# Patient Record
Sex: MALE | Race: WHITE | NOT HISPANIC OR LATINO | URBAN - METROPOLITAN AREA
[De-identification: names, ages, dates, MRNs, and addresses within clinical notes are randomized per-mention and may not be internally consistent; named-entity substitution may affect disease eponyms.]

---

## 2021-10-06 ENCOUNTER — EMERGENCY (EMERGENCY)
Facility: HOSPITAL | Age: 51
LOS: 1 days | Discharge: HOME | End: 2021-10-06
Attending: EMERGENCY MEDICINE | Admitting: EMERGENCY MEDICINE
Payer: COMMERCIAL

## 2021-10-06 VITALS
OXYGEN SATURATION: 97 % | RESPIRATION RATE: 18 BRPM | SYSTOLIC BLOOD PRESSURE: 132 MMHG | HEART RATE: 68 BPM | DIASTOLIC BLOOD PRESSURE: 75 MMHG

## 2021-10-06 VITALS
SYSTOLIC BLOOD PRESSURE: 158 MMHG | OXYGEN SATURATION: 97 % | HEART RATE: 91 BPM | DIASTOLIC BLOOD PRESSURE: 77 MMHG | TEMPERATURE: 99 F | RESPIRATION RATE: 18 BRPM | WEIGHT: 147.93 LBS

## 2021-10-06 DIAGNOSIS — R07.89 OTHER CHEST PAIN: ICD-10-CM

## 2021-10-06 LAB
ALBUMIN SERPL ELPH-MCNC: 4.3 G/DL — SIGNIFICANT CHANGE UP (ref 3.3–5)
ALP SERPL-CCNC: 50 U/L — SIGNIFICANT CHANGE UP (ref 40–120)
ALT FLD-CCNC: 12 U/L — SIGNIFICANT CHANGE UP (ref 10–45)
ANION GAP SERPL CALC-SCNC: 9 MMOL/L — SIGNIFICANT CHANGE UP (ref 5–17)
AST SERPL-CCNC: 17 U/L — SIGNIFICANT CHANGE UP (ref 10–40)
BASOPHILS # BLD AUTO: 0.02 K/UL — SIGNIFICANT CHANGE UP (ref 0–0.2)
BASOPHILS NFR BLD AUTO: 0.3 % — SIGNIFICANT CHANGE UP (ref 0–2)
BILIRUB SERPL-MCNC: 0.2 MG/DL — SIGNIFICANT CHANGE UP (ref 0.2–1.2)
BUN SERPL-MCNC: 16 MG/DL — SIGNIFICANT CHANGE UP (ref 7–23)
CALCIUM SERPL-MCNC: 10 MG/DL — SIGNIFICANT CHANGE UP (ref 8.4–10.5)
CHLORIDE SERPL-SCNC: 102 MMOL/L — SIGNIFICANT CHANGE UP (ref 96–108)
CK MB CFR SERPL CALC: 2.1 NG/ML — SIGNIFICANT CHANGE UP (ref 0–6.7)
CK SERPL-CCNC: 103 U/L — SIGNIFICANT CHANGE UP (ref 30–200)
CO2 SERPL-SCNC: 28 MMOL/L — SIGNIFICANT CHANGE UP (ref 22–31)
CREAT SERPL-MCNC: 0.87 MG/DL — SIGNIFICANT CHANGE UP (ref 0.5–1.3)
EOSINOPHIL # BLD AUTO: 0.03 K/UL — SIGNIFICANT CHANGE UP (ref 0–0.5)
EOSINOPHIL NFR BLD AUTO: 0.5 % — SIGNIFICANT CHANGE UP (ref 0–6)
GLUCOSE SERPL-MCNC: 154 MG/DL — HIGH (ref 70–99)
HCT VFR BLD CALC: 44.2 % — SIGNIFICANT CHANGE UP (ref 39–50)
HGB BLD-MCNC: 14.1 G/DL — SIGNIFICANT CHANGE UP (ref 13–17)
IMM GRANULOCYTES NFR BLD AUTO: 0.5 % — SIGNIFICANT CHANGE UP (ref 0–1.5)
LYMPHOCYTES # BLD AUTO: 1.62 K/UL — SIGNIFICANT CHANGE UP (ref 1–3.3)
LYMPHOCYTES # BLD AUTO: 27.1 % — SIGNIFICANT CHANGE UP (ref 13–44)
MCHC RBC-ENTMCNC: 30.4 PG — SIGNIFICANT CHANGE UP (ref 27–34)
MCHC RBC-ENTMCNC: 31.9 GM/DL — LOW (ref 32–36)
MCV RBC AUTO: 95.3 FL — SIGNIFICANT CHANGE UP (ref 80–100)
MONOCYTES # BLD AUTO: 0.65 K/UL — SIGNIFICANT CHANGE UP (ref 0–0.9)
MONOCYTES NFR BLD AUTO: 10.9 % — SIGNIFICANT CHANGE UP (ref 2–14)
NEUTROPHILS # BLD AUTO: 3.63 K/UL — SIGNIFICANT CHANGE UP (ref 1.8–7.4)
NEUTROPHILS NFR BLD AUTO: 60.7 % — SIGNIFICANT CHANGE UP (ref 43–77)
NRBC # BLD: 0 /100 WBCS — SIGNIFICANT CHANGE UP (ref 0–0)
PLATELET # BLD AUTO: 283 K/UL — SIGNIFICANT CHANGE UP (ref 150–400)
POTASSIUM SERPL-MCNC: 4.5 MMOL/L — SIGNIFICANT CHANGE UP (ref 3.5–5.3)
POTASSIUM SERPL-SCNC: 4.5 MMOL/L — SIGNIFICANT CHANGE UP (ref 3.5–5.3)
PROT SERPL-MCNC: 7.6 G/DL — SIGNIFICANT CHANGE UP (ref 6–8.3)
RBC # BLD: 4.64 M/UL — SIGNIFICANT CHANGE UP (ref 4.2–5.8)
RBC # FLD: 13.3 % — SIGNIFICANT CHANGE UP (ref 10.3–14.5)
SODIUM SERPL-SCNC: 139 MMOL/L — SIGNIFICANT CHANGE UP (ref 135–145)
TROPONIN T SERPL-MCNC: 0.01 NG/ML — SIGNIFICANT CHANGE UP (ref 0–0.01)
WBC # BLD: 5.98 K/UL — SIGNIFICANT CHANGE UP (ref 3.8–10.5)
WBC # FLD AUTO: 5.98 K/UL — SIGNIFICANT CHANGE UP (ref 3.8–10.5)

## 2021-10-06 PROCEDURE — 93010 ELECTROCARDIOGRAM REPORT: CPT

## 2021-10-06 PROCEDURE — 82550 ASSAY OF CK (CPK): CPT

## 2021-10-06 PROCEDURE — 82553 CREATINE MB FRACTION: CPT

## 2021-10-06 PROCEDURE — 36415 COLL VENOUS BLD VENIPUNCTURE: CPT

## 2021-10-06 PROCEDURE — 93005 ELECTROCARDIOGRAM TRACING: CPT

## 2021-10-06 PROCEDURE — 71046 X-RAY EXAM CHEST 2 VIEWS: CPT

## 2021-10-06 PROCEDURE — 71046 X-RAY EXAM CHEST 2 VIEWS: CPT | Mod: 26

## 2021-10-06 PROCEDURE — G1004: CPT

## 2021-10-06 PROCEDURE — 99285 EMERGENCY DEPT VISIT HI MDM: CPT | Mod: 25

## 2021-10-06 PROCEDURE — 85025 COMPLETE CBC W/AUTO DIFF WBC: CPT

## 2021-10-06 PROCEDURE — 75574 CT ANGIO HRT W/3D IMAGE: CPT | Mod: 26,MG

## 2021-10-06 PROCEDURE — 99284 EMERGENCY DEPT VISIT MOD MDM: CPT | Mod: 25

## 2021-10-06 PROCEDURE — 84484 ASSAY OF TROPONIN QUANT: CPT

## 2021-10-06 PROCEDURE — 80053 COMPREHEN METABOLIC PANEL: CPT

## 2021-10-06 PROCEDURE — 75574 CT ANGIO HRT W/3D IMAGE: CPT | Mod: MG

## 2021-10-06 RX ORDER — METOPROLOL TARTRATE 50 MG
100 TABLET ORAL ONCE
Refills: 0 | Status: COMPLETED | OUTPATIENT
Start: 2021-10-06 | End: 2021-10-06

## 2021-10-06 RX ADMIN — Medication 100 MILLIGRAM(S): at 15:03

## 2021-10-06 NOTE — ED PROVIDER NOTE - ATTENDING CONTRIBUTION TO CARE
Pt is afebrile and hemodynamically stable. He has no acute ischemic changes or arrythmia on ECG. Cardiac enzymes wnl. CBC, CMP unremarkable. Coronary CTA obtained and notable for:  Cardiac:  1.  The calcium score is 0 Agatston units.  2.  Normal Left Main.  3.  Mild to moderate stenosis of the mid left anterior descending artery.  4.  Normal left circumflex and right coronary artery.    Non-cardiac:  1. 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    Discussed all results with pt. Will refer to cardiologist for follow up. Return precautions given.    Agree with above note as documented by PA.  I was available as the supervising attending during patient's ER evaluation.    Pt well appearing and aware of results - understands return precautions.  Otherwise, will follow up with PMD/cardiologist.

## 2021-10-06 NOTE — ED PROVIDER NOTE - NSFOLLOWUPINSTRUCTIONS_ED_ALL_ED_FT
Your electrocardiogram, labs, and CT angiogram were reassuring today.    Please follow up with your primary care doctor and a cardiologist (see information below). If you have any difficulty obtaining an appointment, please call our Referrals Coordinator at [phone].     Return to the Emergency Department if you develop fever>100.4F, worsening chest pain, shortness of breath, severe abdominal pain, vomiting or any other concerns.

## 2021-10-06 NOTE — ED PROVIDER NOTE - CARE PROVIDERS DIRECT ADDRESSES
[email or web address]

## 2021-10-06 NOTE — ED PROVIDER NOTE - OBJECTIVE STATEMENT
50yo male with no reported pmhx presents with chest pain. Pt reports onset of left-sided, non-radiating, non-exertional chest pain at 1pm yesterday which lasted approx. 1hr. He states pain resolved and he was able to exercise that evening without chest pain. He reports pain returned this morning around 8am and has been persistent. He denies associated fever, chills, cough, shortness of breath, leg swelling, recent travel or surgery, personal cardiac history, history of DVT/PE, family history of MI <66yo, smoking history.

## 2021-10-06 NOTE — ED PROVIDER NOTE - CARE PROVIDER_API CALL
Scott Camejo  CARDIOLOGY  [address] [address]  Phone: [phone]  Fax: [phone]  Follow Up Time:     Anay London)  Cardiovascular Disease; Internal Medicine  [address] [place] [address], [address], [address] [place] [address]  Phone: [phone]  Fax: [phone]  Follow Up Time:

## 2021-10-06 NOTE — ED PROVIDER NOTE - CLINICAL SUMMARY MEDICAL DECISION MAKING FREE TEXT BOX
Pt is afebrile and hemodynamically stable. He has no acute ischemic changes or arrythmia on ECG. Cardiac enzymes wnl. CBC, CMP unremarkable. Coronary CTA obtained and notable for:  Cardiac:  1.  The calcium score is 0 Agatston units.  2.  Normal Left Main.  3.  Mild to moderate stenosis of the mid left anterior descending artery.  4.  Normal left circumflex and right coronary artery.    Non-cardiac:  1. 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    Discussed all results with pt. Will refer to cardiologist for follow up. Return precautions given.

## 2021-10-06 NOTE — ED ADULT NURSE NOTE - OBJECTIVE STATEMENT
Patient to ED for L sided chest pain since yesterday. States was on a zoom call when had sudden L sided chest tightness. Denies any radiation of pain, resolved with no intervention after a few hours. Pain returned today, prompting ED visit. Denies shortness of breath, dizziness, diaphoresis, nausea/vomiting. Denies fevers/chills or cough. Appears comfortable during assessment, heart sounds auscultated, S1 and S2 heard, lungs clear bilaterally.

## 2021-10-06 NOTE — ED ADULT TRIAGE NOTE - CHIEF COMPLAINT QUOTE
Pt presents to ED C/O intermittent L sided chest pain starting last night. Pt states " My symptoms started last night, then I felt better, but I felt slight pain again this morning" Pt denies SOB, N/V/D, SOB, cough. Denies PMH.

## 2021-10-06 NOTE — ED PROVIDER NOTE - PATIENT PORTAL LINK FT
You can access the FollowMyHealth Patient Portal offered by [facility] by registering at the following website: [email or web address]. By joining [employer]’s FollowMyHealth portal, you will also be able to view your health information using other applications (apps) compatible with our system.

## 2021-11-01 PROBLEM — Z78.9 OTHER SPECIFIED HEALTH STATUS: Chronic | Status: ACTIVE | Noted: 2021-10-06

## 2021-11-06 ENCOUNTER — EMERGENCY (EMERGENCY)
Facility: HOSPITAL | Age: 51
LOS: 1 days | Discharge: HOME | End: 2021-11-06
Attending: EMERGENCY MEDICINE | Admitting: EMERGENCY MEDICINE
Payer: COMMERCIAL

## 2021-11-06 VITALS
SYSTOLIC BLOOD PRESSURE: 126 MMHG | HEART RATE: 77 BPM | OXYGEN SATURATION: 98 % | RESPIRATION RATE: 18 BRPM | HEIGHT: 71 IN | DIASTOLIC BLOOD PRESSURE: 83 MMHG | TEMPERATURE: 98 F | WEIGHT: 147.93 LBS
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
## 2021-11-06 DIAGNOSIS — S61.011A LACERATION WITHOUT FOREIGN BODY OF RIGHT THUMB WITHOUT DAMAGE TO NAIL, INITIAL ENCOUNTER: ICD-10-CM
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
## 2021-11-06 PROCEDURE — 12001 RPR S/N/AX/GEN/TRNK 2.5CM/<: CPT

## 2021-11-06 PROCEDURE — 99283 EMERGENCY DEPT VISIT LOW MDM: CPT | Mod: 25

## 2021-11-06 PROCEDURE — 99282 EMERGENCY DEPT VISIT SF MDM: CPT | Mod: 25

## 2021-11-06 RX ORDER — LIDOCAINE HCL 20 MG/ML
20 VIAL (ML) INJECTION ONCE
Refills: 0 | Status: COMPLETED | OUTPATIENT
Start: 2021-11-06 | End: 2021-11-06

## 2021-11-06 RX ADMIN — Medication 20 MILLILITER(S): at 19:13

## 2021-11-06 NOTE — ED PROVIDER NOTE - NSFOLLOWUPINSTRUCTIONS_ED_ALL_ED_FT
Tissue Adhesive Wound Care      Some cuts, wounds, lacerations, and incisions can be repaired by using tissue adhesive, also called skin glue. It holds the skin together so healing can happen faster. It forms a strong bond on the skin in about 1 minute, and it reaches its full strength in about 2–3 minutes. The adhesive disappears naturally while the wound is healing. It is important to take good care of your wound at home while it heals.      Follow these instructions at home:      Wound care                   •If a bandage (dressing) has been applied, keep it clean and dry.    •Follow instructions from your health care provider about how often to change the dressing.  •Wash your hands with soap and water for at least 20 seconds before and after you change your dressing. If soap and water are not available, use hand .      •Change your dressing as told by your health care provider.      •Leave tissue adhesive in place. It will come off naturally after 7–10 days.        • Do not scratch, rub, or pick at the adhesive.      • Do not place tape over the adhesive. The adhesive could come off the wound when you pull the tape off.      •Protect the wound from further injury until it is healed.    •Check your wound area every day for signs of infection. Check for:  •More redness, swelling, or pain.      •Fluid or blood.      •Warmth.      •Pus or a bad smell.        Bathing   • Do not take baths, swim, or use a hot tub until your health care provider approves. You may only be allowed to take sponge baths. Ask your health care provider if you may take showers.  •You can usually shower after the first 24 hours.      •Cover the dressing with a watertight covering when you take a shower.        • Do not soak the area where there is tissue adhesive.      • Do not use any soaps, petroleum jelly products, or ointments on the wound. Certain ointments can weaken the adhesive.      Eating and drinking   •Eat a diet that includes protein, vitamin A, vitamin C, and other nutrients to help the wound heal. As told by your health care provider, eat a diet that contains food rich in:  •Protein. These include meat, fish, eggs, dairy, beans, and nuts.      •Vitamin A. These include carrots and dark green, leafy vegetables.      •Vitamin C. These include citrus fruits, tomatoes, broccoli, and peppers.        •Drink enough fluid to keep your urine pale yellow.      General instructions     •Protect your wound from the sun when you are outside for the first 6 months, or for as long as told by your health care provider. Apply sunscreen with an SPF of 30 or higher around the scar, or cover it up.      •Take over-the-counter and prescription medicines only as told by your health care provider.      • Do not use any products that contain nicotine or tobacco, such as cigarettes, e-cigarettes, and chewing tobacco. These can delay wound healing. If you need help quitting, ask your health care provider.      •Keep all follow-up visits as told by your health care provider. This is important.        Contact a health care provider if:    •The tissue adhesive becomes soaked with blood or falls off before your wound has healed. The adhesive may need to be replaced.      •You have a fever or chills.        Get help right away if:    •Your wound reopens.    •You have any of these signs of infection:  •More redness, swelling, or pain around the wound.      •A red streak at the area around the wound.      •Fluid or blood coming from the wound.      •Warmth coming from the wound.      •Pus or a bad smell coming from the wound.        •You develop a rash after the adhesive is applied.        Summary    •The adhesive disappears naturally while the wound is healing. It is important to take good care of your wound at home while it heals.      •Always wash your hands for at least 20 seconds with soap and water before and after changing your bandage (dressing).      •To help with healing, eat foods that are rich in protein, vitamin A, vitamin C, and other nutrients.      •Check your wound area every day for signs of infection. Get help right away if you suspect that your wound is infected.      This information is not intended to replace advice given to you by your health care provider. Make sure you discuss any questions you have with your health care provider.      Can take tylenol or motrin every 6hrs as needed for pain.  Stay well hydrated.  Return for fevers, spreading redness, persistent vomit, uncontrolled pain, worsening breathing, worsening lightheaded, focal weakness/numbness, vision changes.  Follow up with primary doctor within 1-2 days.   Return to primary doctor or ER in 7-10 days for suture removal.  You had 2 sutures placed.  Keep dry for first 48hours then clean lightly with soap and water.    Laceration    A laceration is a cut that goes through all of the layers of the skin and into the tissue that is right under the skin. Some lacerations heal on their own. Others need to be closed with skin adhesive strips, skin glue, stitches (sutures), or staples. Proper laceration care minimizes the risk of infection and helps the laceration to heal better.  If non-absorbable stitches or staples have been placed, they must be taken out within the time frame instructed by your healthcare provider.    SEEK IMMEDIATE MEDICAL CARE IF YOU HAVE ANY OF THE FOLLOWING SYMPTOMS: swelling around the wound, worsening pain, drainage from the wound, red streaking going away from your wound, inability to move finger or toe near the laceration, or discoloration of skin near the laceration.

## 2021-11-06 NOTE — ED PROVIDER NOTE - PATIENT PORTAL LINK FT
You can access the FollowMyHealth Patient Portal offered by [facility] by registering at the following website: [email or web address]. By joining [employer]’s FollowMyHealth portal, you will also be able to view your health information using other applications (apps) compatible with our system.

## 2021-11-06 NOTE — ED ADULT NURSE NOTE - OBJECTIVE STATEMENT
pt received into spot L A&Ox3 amb appears comfortable arrives via [facility] intriage for eval of right thumb lac on kitchen knife cleaned it at home cleading controlled with band aid on arrival pt observed sitting hallway reading book prior to eval by Md Tejada unknown last tetanus

## 2021-11-06 NOTE — ED ADULT TRIAGE NOTE - CHIEF COMPLAINT QUOTE
Pt presents to ED c/o laceration to right thumb. Accidentally cut self with new kitchen knife. Last tetanus unknown.

## 2021-11-06 NOTE — ED PROVIDER NOTE - PHYSICAL EXAMINATION
CONSTITUTIONAL: Well appearing, awake, alert and in no apparent distress.  HEENT: Head is atraumatic. Eyes clear bilaterally, normal EOMI. Airway patent.  MUSCULOSKELETAL: Able to bend and extend at DIP joint, superficial 2cm U shaped laceration over knuckle over DIP. Range of motion is not limited, no muscle or joint tenderness. no bony tenderness. no JVD, peripheral edema.   NEUROLOGICAL: Alert and oriented, no focal deficits, no motor or sensory deficits.  SKIN: Skin normal color for race, warm, dry. No evidence of rash.  PSYCHIATRIC: Alert and oriented to person, place, time/situation. normal mood and affect. no apparent risk to self or others.

## 2021-11-06 NOTE — ED PROVIDER NOTE - CLINICAL SUMMARY MEDICAL DECISION MAKING FREE TEXT BOX
52 y/o M with thumb laceration. Will suture and reassess. 52 y/o M with thumb laceration. Despite adequate lidocaine for digital block, pt still with significant pain during procedure. Given superficial, placed 2 sutures w dermabond.

## 2021-11-06 NOTE — ED PROVIDER NOTE - OBJECTIVE STATEMENT
52 y/o M, healthy, comes in with laceration to his R thumb. Patient states that he was using a clean kitchen knife, had just soaped it when it cut his R thumb superficially. No numbness, paresthesias. Able to bend and extend, no other complaints.

## 2021-11-06 NOTE — ED PROCEDURE NOTE - CPROC ED ANATOMIC LOCATION1
R thumb [Fever] : no fever [Sore Throat] : no sore throat [Chest Pain] : no chest pain [Cough] : no cough [Constipation] : no constipation [Dysuria] : no dysuria

## 2021-11-10 ENCOUNTER — EMERGENCY (EMERGENCY)
Facility: HOSPITAL | Age: 51
LOS: 1 days | Discharge: HOME | End: 2021-11-10
Attending: EMERGENCY MEDICINE | Admitting: EMERGENCY MEDICINE
Payer: COMMERCIAL

## 2021-11-10 VITALS
RESPIRATION RATE: 18 BRPM | DIASTOLIC BLOOD PRESSURE: 82 MMHG | SYSTOLIC BLOOD PRESSURE: 140 MMHG | HEART RATE: 88 BPM | TEMPERATURE: 98 F | OXYGEN SATURATION: 100 %

## 2021-11-10 VITALS
OXYGEN SATURATION: 97 % | SYSTOLIC BLOOD PRESSURE: 134 MMHG | DIASTOLIC BLOOD PRESSURE: 85 MMHG | HEIGHT: 71 IN | HEART RATE: 71 BPM | RESPIRATION RATE: 18 BRPM | TEMPERATURE: 98 F | WEIGHT: 147.93 LBS
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
## 2021-11-10 DIAGNOSIS — R21 RASH AND OTHER NONSPECIFIC SKIN ERUPTION: ICD-10-CM

## 2021-11-10 DIAGNOSIS — L03.011 CELLULITIS OF RIGHT FINGER: ICD-10-CM

## 2021-11-10 DIAGNOSIS — S61.011D LACERATION WITHOUT FOREIGN BODY OF RIGHT THUMB WITHOUT DAMAGE TO NAIL, SUBSEQUENT ENCOUNTER: ICD-10-CM

## 2021-11-10 DIAGNOSIS — R20.0 ANESTHESIA OF SKIN: ICD-10-CM
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
## 2021-11-10 DIAGNOSIS — L03.113 CELLULITIS OF RIGHT UPPER LIMB: ICD-10-CM

## 2021-11-10 PROBLEM — Z00.00 ENCOUNTER FOR PREVENTIVE HEALTH EXAMINATION: Status: ACTIVE | Noted: 2021-11-10

## 2021-11-10 LAB
ALBUMIN SERPL ELPH-MCNC: 4.5 G/DL — SIGNIFICANT CHANGE UP (ref 3.3–5)
ALP SERPL-CCNC: 61 U/L — SIGNIFICANT CHANGE UP (ref 40–120)
ALT FLD-CCNC: 19 U/L — SIGNIFICANT CHANGE UP (ref 10–45)
ANION GAP SERPL CALC-SCNC: 10 MMOL/L — SIGNIFICANT CHANGE UP (ref 5–17)
AST SERPL-CCNC: 21 U/L — SIGNIFICANT CHANGE UP (ref 10–40)
BASOPHILS # BLD AUTO: 0.02 K/UL — SIGNIFICANT CHANGE UP (ref 0–0.2)
BASOPHILS NFR BLD AUTO: 0.2 % — SIGNIFICANT CHANGE UP (ref 0–2)
BILIRUB SERPL-MCNC: 0.4 MG/DL — SIGNIFICANT CHANGE UP (ref 0.2–1.2)
BUN SERPL-MCNC: 12 MG/DL — SIGNIFICANT CHANGE UP (ref 7–23)
CALCIUM SERPL-MCNC: 9.7 MG/DL — SIGNIFICANT CHANGE UP (ref 8.4–10.5)
CHLORIDE SERPL-SCNC: 102 MMOL/L — SIGNIFICANT CHANGE UP (ref 96–108)
CO2 SERPL-SCNC: 30 MMOL/L — SIGNIFICANT CHANGE UP (ref 22–31)
CREAT SERPL-MCNC: 0.84 MG/DL — SIGNIFICANT CHANGE UP (ref 0.5–1.3)
EOSINOPHIL # BLD AUTO: 0.05 K/UL — SIGNIFICANT CHANGE UP (ref 0–0.5)
EOSINOPHIL NFR BLD AUTO: 0.6 % — SIGNIFICANT CHANGE UP (ref 0–6)
GLUCOSE SERPL-MCNC: 102 MG/DL — HIGH (ref 70–99)
HCT VFR BLD CALC: 43.6 % — SIGNIFICANT CHANGE UP (ref 39–50)
HGB BLD-MCNC: 13.9 G/DL — SIGNIFICANT CHANGE UP (ref 13–17)
IMM GRANULOCYTES NFR BLD AUTO: 0.2 % — SIGNIFICANT CHANGE UP (ref 0–1.5)
LACTATE SERPL-SCNC: 0.8 MMOL/L — SIGNIFICANT CHANGE UP (ref 0.5–2)
LYMPHOCYTES # BLD AUTO: 1.85 K/UL — SIGNIFICANT CHANGE UP (ref 1–3.3)
LYMPHOCYTES # BLD AUTO: 21.7 % — SIGNIFICANT CHANGE UP (ref 13–44)
MCHC RBC-ENTMCNC: 29.8 PG — SIGNIFICANT CHANGE UP (ref 27–34)
MCHC RBC-ENTMCNC: 31.9 GM/DL — LOW (ref 32–36)
MCV RBC AUTO: 93.6 FL — SIGNIFICANT CHANGE UP (ref 80–100)
MONOCYTES # BLD AUTO: 0.71 K/UL — SIGNIFICANT CHANGE UP (ref 0–0.9)
MONOCYTES NFR BLD AUTO: 8.3 % — SIGNIFICANT CHANGE UP (ref 2–14)
NEUTROPHILS # BLD AUTO: 5.86 K/UL — SIGNIFICANT CHANGE UP (ref 1.8–7.4)
NEUTROPHILS NFR BLD AUTO: 69 % — SIGNIFICANT CHANGE UP (ref 43–77)
NRBC # BLD: 0 /100 WBCS — SIGNIFICANT CHANGE UP (ref 0–0)
PLATELET # BLD AUTO: 317 K/UL — SIGNIFICANT CHANGE UP (ref 150–400)
POTASSIUM SERPL-MCNC: 4.1 MMOL/L — SIGNIFICANT CHANGE UP (ref 3.5–5.3)
POTASSIUM SERPL-SCNC: 4.1 MMOL/L — SIGNIFICANT CHANGE UP (ref 3.5–5.3)
PROT SERPL-MCNC: 8 G/DL — SIGNIFICANT CHANGE UP (ref 6–8.3)
RBC # BLD: 4.66 M/UL — SIGNIFICANT CHANGE UP (ref 4.2–5.8)
RBC # FLD: 12.8 % — SIGNIFICANT CHANGE UP (ref 10.3–14.5)
SODIUM SERPL-SCNC: 142 MMOL/L — SIGNIFICANT CHANGE UP (ref 135–145)
WBC # BLD: 8.51 K/UL — SIGNIFICANT CHANGE UP (ref 3.8–10.5)
WBC # FLD AUTO: 8.51 K/UL — SIGNIFICANT CHANGE UP (ref 3.8–10.5)

## 2021-11-10 PROCEDURE — 73140 X-RAY EXAM OF FINGER(S): CPT

## 2021-11-10 PROCEDURE — 85025 COMPLETE CBC W/AUTO DIFF WBC: CPT

## 2021-11-10 PROCEDURE — 99284 EMERGENCY DEPT VISIT MOD MDM: CPT | Mod: 25

## 2021-11-10 PROCEDURE — 96374 THER/PROPH/DIAG INJ IV PUSH: CPT

## 2021-11-10 PROCEDURE — 83605 ASSAY OF LACTIC ACID: CPT

## 2021-11-10 PROCEDURE — 73140 X-RAY EXAM OF FINGER(S): CPT | Mod: 26,RT

## 2021-11-10 PROCEDURE — 80053 COMPREHEN METABOLIC PANEL: CPT

## 2021-11-10 PROCEDURE — 36415 COLL VENOUS BLD VENIPUNCTURE: CPT

## 2021-11-10 PROCEDURE — 96375 TX/PRO/DX INJ NEW DRUG ADDON: CPT

## 2021-11-10 PROCEDURE — 99284 EMERGENCY DEPT VISIT MOD MDM: CPT

## 2021-11-10 PROCEDURE — 87040 BLOOD CULTURE FOR BACTERIA: CPT

## 2021-11-10 RX ORDER — DIPHENHYDRAMINE HCL 50 MG
50 CAPSULE ORAL ONCE
Refills: 0 | Status: COMPLETED | OUTPATIENT
Start: 2021-11-10 | End: 2021-11-10

## 2021-11-10 RX ORDER — AMPICILLIN SODIUM AND SULBACTAM SODIUM 250; 125 MG/ML; MG/ML
3 INJECTION, POWDER, FOR SUSPENSION INTRAMUSCULAR; INTRAVENOUS ONCE
Refills: 0 | Status: COMPLETED | OUTPATIENT
Start: 2021-11-10 | End: 2021-11-10

## 2021-11-10 RX ORDER — PIPERACILLIN AND TAZOBACTAM 4; .5 G/20ML; G/20ML
3.38 INJECTION, POWDER, LYOPHILIZED, FOR SOLUTION INTRAVENOUS ONCE
Refills: 0 | Status: DISCONTINUED | OUTPATIENT
Start: 2021-11-10 | End: 2021-11-10

## 2021-11-10 RX ORDER — KETOROLAC TROMETHAMINE 30 MG/ML
15 SYRINGE (ML) INJECTION ONCE
Refills: 0 | Status: DISCONTINUED | OUTPATIENT
Start: 2021-11-10 | End: 2021-11-10

## 2021-11-10 RX ORDER — TETANUS TOXOID, REDUCED DIPHTHERIA TOXOID AND ACELLULAR PERTUSSIS VACCINE, ADSORBED 5; 2.5; 8; 8; 2.5 [IU]/.5ML; [IU]/.5ML; UG/.5ML; UG/.5ML; UG/.5ML
0.5 SUSPENSION INTRAMUSCULAR ONCE
Refills: 0 | Status: DISCONTINUED | OUTPATIENT
Start: 2021-11-10 | End: 2021-11-13

## 2021-11-10 RX ORDER — VANCOMYCIN HCL 1 G
1000 VIAL (EA) INTRAVENOUS ONCE
Refills: 0 | Status: COMPLETED | OUTPATIENT
Start: 2021-11-10 | End: 2021-11-10

## 2021-11-10 RX ADMIN — AMPICILLIN SODIUM AND SULBACTAM SODIUM 200 GRAM(S): 250; 125 INJECTION, POWDER, FOR SUSPENSION INTRAMUSCULAR; INTRAVENOUS at 14:04

## 2021-11-10 RX ADMIN — Medication 250 MILLIGRAM(S): at 13:00

## 2021-11-10 NOTE — ED PROVIDER NOTE - OBJECTIVE STATEMENT
52 y/o M denies any PMHx s/p laceration repair at [facility] on Saturday. Pt was cleaning a new knife when he lacerated his right distal DIP joint of thumb. Came to [facility], had 2 sutures placed, and was given Dermabond. Was not prescribed any prophylaxis antibiotics at that time. Pt states he has been keeping the site clean and wrapped as directed. Now presents to ED today with warm rash and numbness at laceration and has been tracking up the arm to armpit for the past 3 hours. Denies fever, chills, N/V, or pain or swelling to upper extremities.

## 2021-11-10 NOTE — ED PROVIDER NOTE - NSFOLLOWUPINSTRUCTIONS_ED_ALL_ED_FT
Continue medications as prescribed and follow up with  next week for re-evaluation next week         CELLULITIS - AfterCare(R) Instructions(ER/ED)           Cellulitis    WHAT YOU NEED TO KNOW:    Cellulitis is a skin infection caused by bacteria. Cellulitis is common and can become severe. Cellulitis usually appears on the lower legs. It can also appear on the arms, face, and other areas. Cellulitis develops when bacteria enter a crack or break in your skin, such as a scratch, bite, or cut.    Cellulitis          DISCHARGE INSTRUCTIONS:    Return to the emergency department if:   •Your wound gets larger and more painful.      •You feel a crackling under your skin when you touch it.      •You have purple dots or bumps on your skin, or you see bleeding under your skin.      •You see red streaks coming from the infected area.      Call your doctor if:   •The red, warm, swollen area gets larger.      •Your fever or pain does not go away or gets worse.      •The area does not get smaller after 3 days of antibiotics.      •You have questions or concerns about your condition or care.      Medicines: You should start to see improvement in 3 days. If cellulitis is not treated, the infection can spread through your body and become life-threatening. You may need any of the following medicines:  •Antibiotics help treat the bacterial infection.      •Acetaminophen decreases pain and fever. It is available without a doctor's order. Ask how much to take and how often to take it. Follow directions. Read the labels of all other medicines you are using to see if they also contain acetaminophen, or ask your doctor or pharmacist. Acetaminophen can cause liver damage if not taken correctly. Do not use more than 4 grams (4,000 milligrams) total of acetaminophen in one day.       •NSAIDs, such as ibuprofen, help decrease swelling, pain, and fever. This medicine is available with or without a doctor's order. NSAIDs can cause stomach bleeding or kidney problems in certain people. If you take blood thinner medicine, always ask your healthcare provider if NSAIDs are safe for you. Always read the medicine label and follow directions.      •Take your medicine as directed. Contact your healthcare provider if you think your medicine is not helping or if you have side effects. Tell him or her if you are allergic to any medicine. Keep a list of the medicines, vitamins, and herbs you take. Include the amounts, and when and why you take them. Bring the list or the pill bottles to follow-up visits. Carry your medicine list with you in case of an emergency.      Self-care:   •Wash the area with soap and water every day. Gently pat dry. Use bandages if directed by your healthcare provider.      •Elevate the area above the level of your heart as often as you can. This will help decrease swelling and pain. Prop the area on pillows or blankets to keep it elevated comfortably.  Elevate Leg           •Place a cool, damp cloth on the area. Use clean cloths and clean water. You can do this as often as you need to. Cool, damp cloths may help decrease pain.      •Apply cream or ointment as directed. These help protect the area. Most over-the-counter products, such as petroleum jelly, are good to use. Ask your healthcare provider about specific creams or ointments you should use.      Prevent cellulitis:   •Do not scratch bug bites or areas of injury. You increase your risk for cellulitis by scratching these areas.      •Do not share personal items, such as towels, clothing, and razors.      •Clean exercise equipment with germ-killing  before and after you use it.      •Treat athlete’s foot. This can help prevent the spread of a bacterial skin infection.      •Wash your hands often. Use soap and water. Wash your hands after you use the bathroom, change a child's diapers, or sneeze. Wash your hands before you prepare or eat food. Use lotion to prevent dry, cracked skin.  Handwashing           Follow up with your doctor within 3 days, or as directed: He or she will check if your cellulitis is getting better. Write down your questions so you remember to ask them during your visits.

## 2021-11-10 NOTE — ED PROVIDER NOTE - PATIENT PORTAL LINK FT
You can access the FollowMyHealth Patient Portal offered by [facility] by registering at the following website: [email or web address]. By joining [employer]’s FollowMyHealth portal, you will also be able to view your health information using other applications (apps) compatible with our system.

## 2021-11-10 NOTE — ED PROVIDER NOTE - CLINICAL SUMMARY MEDICAL DECISION MAKING FREE TEXT BOX
50 y/o M denies PMHx s/p laceration repair on Saturday to the right distal DIP joint of thumb presents to ED with rash, pain, and swelling to laceration. Rash has been tracking up arm from laceration to armpit for the past 3 hours and is rapidly progressing. Plan for labs (including blood cultures) and give antibiotics.

## 2021-11-10 NOTE — ED PROVIDER NOTE - SKIN, MLM
Right thumb DIP joint is noted to have 2 sutures and Dermabond placed on laceration. Joint is mildly swollen, red and tender to palpation but has no limited ROM. Rash tracks from the laceration and to armpit.

## 2021-11-10 NOTE — CONSULT NOTE ADULT - TIME BILLING
took history, examined patient, reviewed x-ray, discussed treatment plan with patient and ER staff Preventive measure

## 2021-11-10 NOTE — ED ADULT TRIAGE NOTE - CHIEF COMPLAINT QUOTE
Pt presents to ED c/o rash. Seen here 3 days ago for lac repair of right thumb. This morning noticed rash going up right arm. Denies itchiness, fevers, chills, chest pain, shortness of breath. Noted to have red streaking up right arm.

## 2021-11-10 NOTE — ED PROVIDER NOTE - ATTENDING CONTRIBUTION TO CARE
+ laceration by kitchen knife a few days ago over Rt thumb/ over DIP repaired w sutures and dermabond a few days ago,   new streaking / pain today, + lyphangitis , thumb w movement ok / sore but no severe pain, mild thumb swelling, doubt tenosynovitis, likely cellullitis   will give IV antibiotics, consult plastics + laceration by kitchen knife a few days ago over Rt thumb/ over DIP repaired w sutures and Dermabond a few days ago,   new streaking / pain today, + lymphangitis , thumb w movement ok / sore but no severe pain, mild thumb swelling, doubt tenosynovitis, likely cellulitis   will give IV antibiotics, consult plastics

## 2021-11-10 NOTE — CONSULT NOTE ADULT - SUBJECTIVE AND OBJECTIVE BOX
Cut his right thumb dorsum while cleaning a new kitchen knife.  Healing wound of dorsum right thumb without erythema, but erythema of forearm to upper arm without lymph nodes  Active flexion and extension of right thumb

## 2021-11-11 ENCOUNTER — APPOINTMENT (OUTPATIENT)
Dept: UROLOGY | Facility: CLINIC | Age: 51
End: 2021-11-11
Payer: COMMERCIAL

## 2021-11-11 DIAGNOSIS — Z78.9 OTHER SPECIFIED HEALTH STATUS: ICD-10-CM

## 2021-11-11 PROCEDURE — 99204 OFFICE O/P NEW MOD 45 MIN: CPT

## 2021-11-11 PROCEDURE — 51798 US URINE CAPACITY MEASURE: CPT

## 2021-11-12 PROBLEM — Z78.9 DOES NOT USE TOBACCO: Status: ACTIVE | Noted: 2021-11-11

## 2021-11-12 PROBLEM — Z78.9 ALCOHOL INGESTION: Status: ACTIVE | Noted: 2021-11-11

## 2021-11-12 LAB
ANION GAP SERPL CALC-SCNC: 14 MMOL/L
APPEARANCE: CLEAR
BACTERIA: NEGATIVE
BASOPHILS # BLD AUTO: 0.02 K/UL
BASOPHILS NFR BLD AUTO: 0.3 %
BILIRUBIN URINE: NEGATIVE
BLOOD URINE: NEGATIVE
BUN SERPL-MCNC: 14 MG/DL
CALCIUM SERPL-MCNC: 9.9 MG/DL
CHLORIDE SERPL-SCNC: 100 MMOL/L
CO2 SERPL-SCNC: 27 MMOL/L
COLOR: YELLOW
CREAT SERPL-MCNC: 0.8 MG/DL
EOSINOPHIL # BLD AUTO: 0.08 K/UL
EOSINOPHIL NFR BLD AUTO: 1.1 %
GLUCOSE QUALITATIVE U: NEGATIVE
GLUCOSE SERPL-MCNC: 84 MG/DL
HCT VFR BLD CALC: 44.4 %
HGB BLD-MCNC: 14.4 G/DL
HYALINE CASTS: 0 /LPF
IMM GRANULOCYTES NFR BLD AUTO: 0.3 %
KETONES URINE: NEGATIVE
LEUKOCYTE ESTERASE URINE: NEGATIVE
LYMPHOCYTES # BLD AUTO: 2.25 K/UL
LYMPHOCYTES NFR BLD AUTO: 31.7 %
MAN DIFF?: NORMAL
MCHC RBC-ENTMCNC: 31 PG
MCHC RBC-ENTMCNC: 32.4 GM/DL
MCV RBC AUTO: 95.7 FL
MICROSCOPIC-UA: NORMAL
MONOCYTES # BLD AUTO: 0.82 K/UL
MONOCYTES NFR BLD AUTO: 11.5 %
NEUTROPHILS # BLD AUTO: 3.91 K/UL
NEUTROPHILS NFR BLD AUTO: 55.1 %
NITRITE URINE: NEGATIVE
PH URINE: 7.5
PLATELET # BLD AUTO: 356 K/UL
POTASSIUM SERPL-SCNC: 4.4 MMOL/L
PROTEIN URINE: NORMAL
PSA SERPL-MCNC: 0.69 NG/ML
RBC # BLD: 4.64 M/UL
RBC # FLD: 13 %
RED BLOOD CELLS URINE: 2 /HPF
SODIUM SERPL-SCNC: 141 MMOL/L
SPECIFIC GRAVITY URINE: 1.02
SQUAMOUS EPITHELIAL CELLS: 0 /HPF
UROBILINOGEN URINE: NORMAL
WBC # FLD AUTO: 7.1 K/UL
WHITE BLOOD CELLS URINE: 0 /HPF

## 2021-11-12 NOTE — HISTORY OF PRESENT ILLNESS
[FreeTextEntry1] : %1 year old  male with mild frequency, nocturia x1 and no family history of prostate cancer. Patient is a father of one child, going through divorce and wishes to undergo a vasectomy. No erectile or ejaculatory issues

## 2021-11-12 NOTE — REVIEW OF SYSTEMS
[denies] : denies pain with orgasm [Wake up at night to urinate  How many times?  ___] : wakes up to urinate [unfilled] times during the night [Negative] : Heme/Lymph [Nocturia] : nocturia [Loss of interest] : denies loss of interest in sexual activity [Genital bacterial infection] : denies genital bacterial infection [Genital yeast infection] : denies genital yeast infection [Sexually Transmitted Disease (STD)] : denies sexually transmitted disease [Strong urge to urinate] : denies strong urge to urinate [Bladder pressure] : denies bladder pressure [Strain or push to urinate] : denies straining or pushing to urinate [Wait a long time to urinate] : denies waiting a long time to urinate [Slow urine stream] : denies slow urine stream [Interrupted urine stream] : denies interrupted urine stream [Bladder fullness after urinating] : denies bladder fullness after urinating [Increased pain/discomfort with bladder filling] : denies increased pain/discomfort with bladder filling [Bladder problems as child. If yes, describe..] : denies bladder problems as child [Leakage of urine with straining, coughing, laughing] : denies leakage of urine with straining, coughing, and/or laughing [Unaware of when urine is leaking] : aware of when urine is leaking

## 2021-11-12 NOTE — LETTER BODY
[Dear  ___] : Dear  [unfilled], [Consult Letter:] : I had the pleasure of evaluating your patient, [unfilled]. [Please see my note below.] : Please see my note below. [Consult Closing:] : Thank you very much for allowing me to participate in the care of this patient.  If you have any questions, please do not hesitate to contact me. [Sincerely,] : Sincerely, [FreeTextEntry3] : Jaylan

## 2021-11-12 NOTE — ASSESSMENT
[FreeTextEntry1] : The patient has mild BPH, which we will treat conservatively. Today, we discussed vasectomy procedure at length, including postoperative follow up and possible sequelae/complications. The patient would like to go ahead. He signed the [place] [employer] sterilization form and will be scheduled after the 30 day obligatory waiting period. We will send his serum for baseline PSA determination as well as presurgical labs.

## 2021-11-13 LAB — BACTERIA UR CULT: NORMAL

## 2021-11-15 LAB
CULTURE RESULTS: SIGNIFICANT CHANGE UP
CULTURE RESULTS: SIGNIFICANT CHANGE UP
SPECIMEN SOURCE: SIGNIFICANT CHANGE UP
SPECIMEN SOURCE: SIGNIFICANT CHANGE UP

## 2022-01-01 NOTE — ED PROVIDER NOTE - PSYCHIATRIC, MLM
Alert and oriented to person, place, time/situation. normal mood and affect. no apparent risk to self or others. This was a shared visit with the [facility]. I reviewed and verified the documentation and independently performed the documented:

## 2022-01-21 ENCOUNTER — LABORATORY RESULT (OUTPATIENT)
Age: 52
End: 2022-01-21

## 2022-01-24 ENCOUNTER — TRANSCRIPTION ENCOUNTER (OUTPATIENT)
Age: 52
End: 2022-01-24

## 2022-01-25 ENCOUNTER — NON-APPOINTMENT (OUTPATIENT)
Age: 52
End: 2022-01-25

## 2022-01-25 ENCOUNTER — OUTPATIENT (OUTPATIENT)
Dept: OUTPATIENT SERVICES | Facility: HOSPITAL | Age: 52
LOS: 1 days | Discharge: HOME | End: 2022-01-25
Payer: COMMERCIAL

## 2022-01-25 ENCOUNTER — APPOINTMENT (OUTPATIENT)
Dept: UROLOGY | Facility: AMBULATORY SURGERY CENTER | Age: 52
End: 2022-01-25

## 2022-01-25 ENCOUNTER — RESULT REVIEW (OUTPATIENT)
Age: 52
End: 2022-01-25

## 2022-01-25 PROCEDURE — 55250 REMOVAL OF SPERM DUCT(S): CPT

## 2022-01-25 PROCEDURE — 88302 TISSUE EXAM BY PATHOLOGIST: CPT | Mod: 26

## 2022-01-28 ENCOUNTER — NON-APPOINTMENT (OUTPATIENT)
Age: 52
End: 2022-01-28

## 2022-01-31 LAB — SURGICAL PATHOLOGY STUDY: SIGNIFICANT CHANGE UP

## 2022-02-24 ENCOUNTER — APPOINTMENT (OUTPATIENT)
Dept: UROLOGY | Facility: CLINIC | Age: 52
End: 2022-02-24
Payer: COMMERCIAL

## 2022-02-24 ENCOUNTER — LABORATORY RESULT (OUTPATIENT)
Age: 52
End: 2022-02-24

## 2022-02-24 VITALS
RESPIRATION RATE: 18 BRPM | BODY MASS INDEX: 20.86 KG/M2 | HEART RATE: 69 BPM | TEMPERATURE: 98.2 F | WEIGHT: 149 LBS | HEIGHT: 71 IN | OXYGEN SATURATION: 98 % | SYSTOLIC BLOOD PRESSURE: 136 MMHG | DIASTOLIC BLOOD PRESSURE: 94 MMHG

## 2022-02-24 DIAGNOSIS — Z30.2 ENCOUNTER FOR STERILIZATION: ICD-10-CM

## 2022-02-24 DIAGNOSIS — N40.1 BENIGN PROSTATIC HYPERPLASIA WITH LOWER URINARY TRACT SYMPMS: ICD-10-CM

## 2022-02-24 PROCEDURE — 99213 OFFICE O/P EST LOW 20 MIN: CPT

## 2022-02-24 NOTE — ASSESSMENT
[FreeTextEntry1] : The patient is doing well following his procedure.  We will continue conservative management BPH and the patient will have a postvasectomy semen analysis; if it reveals azoospermia, he will come back in November for annual prostate check.

## 2022-02-24 NOTE — HISTORY OF PRESENT ILLNESS
[FreeTextEntry1] : The patient is 1 month status post vasectomy.  He is without complaint except for mild scrotal discomfort.  He denies any new voiding complaints.  His baseline PSA in November 2021 was 0.69 ng/mL.

## 2022-02-24 NOTE — LETTER BODY
[Dear  ___] : Dear  [unfilled], [Courtesy Letter:] : I had the pleasure of seeing your patient, [unfilled], in my office today. [Please see my note below.] : Please see my note below. [Consult Closing:] : Thank you very much for allowing me to participate in the care of this patient.  If you have any questions, please do not hesitate to contact me. [Sincerely,] : Sincerely, [FreeTextEntry3] : Jaylan

## 2022-02-24 NOTE — REVIEW OF SYSTEMS
[Wake up at night to urinate  How many times?  ___] : wakes up to urinate [unfilled] times during the night [Negative] : Heme/Lymph [Nocturia] : nocturia [denies] : denies pain with orgasm [Loss of interest] : denies loss of interest in sexual activity [Genital bacterial infection] : denies genital bacterial infection [Genital yeast infection] : denies genital yeast infection [Sexually Transmitted Disease (STD)] : denies sexually transmitted disease [Urine Infection (bladder/kidney)] : denies bladder/kidney infections [Pain during urination] : denies pain during urination [Pain at onset of urination] : denies pain during onset of urination [Pain after urination] : denies pain after urination [Blood in urine that you can see] : denies seeing blood in urine [Told you have blood in urine on a urine test] : denies being told that blood was present in a urine test [Discharge from urine canal] : denies discharge from urine canal [History of kidney stones] : denies history of kidney stones [Urine retention] : denies urine retention [Strong urge to urinate] : denies strong urge to urinate [Bladder pressure] : denies bladder pressure [Strain or push to urinate] : denies straining or pushing to urinate [Wait a long time to urinate] : denies waiting a long time to urinate [Slow urine stream] : denies slow urine stream [Interrupted urine stream] : denies interrupted urine stream [Bladder fullness after urinating] : denies bladder fullness after urinating [Increased pain/discomfort with bladder filling] : denies increased pain/discomfort with bladder filling [Bladder problems as child. If yes, describe..] : denies bladder problems as child [Leakage of urine with straining, coughing, laughing] : denies leakage of urine with straining, coughing, and/or laughing [Unaware of when urine is leaking] : aware of when urine is leaking

## 2022-02-24 NOTE — PHYSICAL EXAM
[General Appearance - Well Developed] : well developed [General Appearance - Well Nourished] : well nourished [Normal Appearance] : normal appearance [Well Groomed] : well groomed [General Appearance - In No Acute Distress] : no acute distress [Abdomen Soft] : soft [Abdomen Tenderness] : non-tender [Costovertebral Angle Tenderness] : no ~M costovertebral angle tenderness [Urethral Meatus] : meatus normal [Penis Abnormality] : normal circumcised penis [Urinary Bladder Findings] : the bladder was normal on palpation [Scrotum] : the scrotum was normal [Testes Mass (___cm)] : there were no testicular masses [FreeTextEntry1] : wound well healed, non tender [Edema] : no peripheral edema [] : no respiratory distress [Respiration, Rhythm And Depth] : normal respiratory rhythm and effort [Exaggerated Use Of Accessory Muscles For Inspiration] : no accessory muscle use [Oriented To Time, Place, And Person] : oriented to person, place, and time [Affect] : the affect was normal [Mood] : the mood was normal [Not Anxious] : not anxious [Normal Station and Gait] : the gait and station were normal for the patient's age [No Focal Deficits] : no focal deficits [No Palpable Adenopathy] : no palpable adenopathy

## 2022-02-25 DIAGNOSIS — Z98.52 VASECTOMY STATUS: ICD-10-CM

## 2022-03-14 ENCOUNTER — NON-APPOINTMENT (OUTPATIENT)
Age: 52
End: 2022-03-14

## 2022-11-10 ENCOUNTER — APPOINTMENT (OUTPATIENT)
Dept: UROLOGY | Facility: CLINIC | Age: 52
End: 2022-11-10

## 2023-02-07 NOTE — ED ADULT NURSE NOTE - CAS TRG GEN SKIN CONDITION
Scheduled date of EGD/colonoscopy (as of today): 03/02/23  Physician performing EGD/colonoscopy: LANRE  [facility]  Desired bowel prep reviewed with patient: DON SPEARS [facility]  Instructions reviewed with patient by: VIR[place]  Clearances: NONE Warm

## 2024-02-09 NOTE — ED PROVIDER NOTE - IV ALTEPLASE INCLUSION HIDDEN
Patient is calling to see if there is a generic or cheaper brand of the medication Carbamazepine 400mg tabs is over $400.00 also the levetiracetam, rosuvastatin and the sildenafil cost has went up    
Writer returned call to patient. Discussed with patient the potential of using [facility] as it appears it could be cheaper for patient. Patient is going to think about this and call us back if he decides to pursue GoodRX  
show

## 2024-08-28 NOTE — ED PROVIDER NOTE - NS ED ATTENDING STATEMENT MOD
Patient: Niels Dexter Date: 2024   : 1955    68 year old male      INPATIENT WOUND CARE CONSULT NOTE    Supervising Wound Care / Hyperbaric Medicine Physician: Not Applicable  Consulting Provider:  MILAD Ashraf  Date of Consultation/Last Comprehensive Exam:  3/6/24, 24  Referring  Provider:  HAYLEE Chi    SUBJECTIVE:    Chief Complaint:  left chest wound    Wound/Ulcer Present:    Non-healing surgical wound    Additional Wound Category:  None     Maximum Baseline Ambulatory Status:  Unable to assess    History of Present Illness:  This is a 68 year old male with left chest wound s/p excision of squamous cell carcinoma.      Relevant Medical History:  A-fib  Diabetes, type 2  CHF  HTN, HLD  CAD  Obesity, BMI >45%  S/P Left knee replacement    At Baseline:  Lives with wife, Ishaan  PCP Dr. Roberto Tovar  Uses walker  Wears compression stockings    Wound History - Left ankle:  Admitted to [facility] 2024 for CHF exacerbation.  Patient's wife Ishaan states he had compression stockings on for 3 days and when removed he had developed a pressure injury the posterior leg. They state it quickly progressed with exposed tendon.    Healed 24      Wound History - Left Knee:  Initial knee replacement in , then partial replacement in  d/t chronic infection.  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 Pt opted for long term suppressive antibiotics vs surgical intervention.     Admitted to [facility] 3/5/24 for left knee infection and cellulitis. Ortho and ID following.   NM bone scan 3/11/24 with findings suspicious for left knee prosthetic infection.   24 - orthopedic managing    Interval History 24:  Patient admitted to [facility] 24 for orthopedic surgery  S/P Left knee spacer removal, reimplant and total knee arthroplasty  RN wound care team was consulted for left chest wound and  subsequently wound provider consulted  Left chest s/p excision of squamous cell carcinoma on 8/8/24 by Dr. Cee  Patient states he \"cause sutures to pop\" by pushing at home.      Review of Systems:  Pertinent items are noted in HPI (history of present illness).    Past Medical History:   Diagnosis Date    Anxiety     Benign essential HTN     Chronic a-fib  (CMD)     resolved after Maze procedure    Chronic pain     back pain    Congestive cardiac failure  (CMD)     Coronary artery disease     Hyperlipidemia     Left leg cellulitis     Hospitalization 03/05/2024-03/13/2024    Morbid obesity with BMI of 45.0-49.9, adult  (CMD)     OMER (obstructive sleep apnea)     Uses BI PAP nightly    Osteoarthritis of right knee     Pulmonary HTN  (CMD)     Sciatica     Syncope     Type II diabetes mellitus with stage 3 chronic kidney disease  (CMD)     Uses walker     Warfarin anticoagulation     Wears glasses      Past Surgical History:   Procedure Laterality Date    Cardiac catherization      Coronary artery bypass graft  07/2019    Incision and drainage Left 07/01/2022    Left knee I&D    Knee arthroplasty Left 11/21/2019    Dr. Johnson    Reconstruction atria extensive maze w bypass  07/2019    Removal gallbladder  05/06/2020    Rhinoplasty tip      x2    Total knee  prosthesis removal w/ spacer insertion Left 06/11/2024    Dr. Ascencion Andino / [facility]    Total knee replacement Left 08/27/2024    Abx spacer explant to hinge knee / Dr. Ascencion Andino / [facility]     Social History     Socioeconomic History    Marital status: /Civil Union     Spouse name: Not on file    Number of children: Not on file    Years of education: Not on file    Highest education level: Not on file   Occupational History    Not on file   Tobacco Use    Smoking status: Never     Passive exposure: Never    Smokeless tobacco: Never   Vaping Use    Vaping status: never used   Substance and Sexual Activity    Alcohol use: Not Currently     Alcohol/week:  1.0 standard drink of alcohol     Types: 1 Cans of beer per week    Drug use: No    Sexual activity: Not on file   Other Topics Concern    Not on file   Social History Narrative    Not on file     Social Determinants of Health     Financial Resource Strain: Low Risk  (8/27/2024)    Financial Resource Strain     Unable to Get: None   Food Insecurity: Low Risk  (8/27/2024)    Food Insecurity     Worried about Food: Never true     Food is Gone: Never true   Transportation Needs: Not At Risk (8/27/2024)    Transportation Needs     Lack of Reliable Transportation: No   Physical Activity: Not on file   Stress: Not on file   Social Connections: Low Risk  (8/27/2024)    Social Connections     Social Connectivity: 5 or more times a week   Interpersonal Safety: Low Risk  (8/27/2024)    Interpersonal Safety     How often physically hurt: Never     How often insulted or talked down to: Never     How often threatened with harm: Never     How often scream or curse at: Never     Family History   Problem Relation Age of Onset    Hypertension Mother     Stroke Mother     Cancer Father         lung    Cancer Brother         lung       Current Facility-Administered Medications   Medication    oxyCODONE (IMM REL) (ROXICODONE) tablet 5 mg    Or    oxyCODONE (IMM REL) (ROXICODONE) tablet 10 mg    morphine injection 2 mg    cyclobenzaprine (FLEXERIL) tablet 5 mg    polyethylene glycol (MIRALAX) packet 17 g    dextrose 50 % injection 25 g    dextrose 50 % injection 12.5 g    glucagon (GLUCAGEN) injection 1 mg    dextrose (GLUTOSE) 40 % gel 15 g    dextrose (GLUTOSE) 40 % gel 30 g    allopurinol (ZYLOPRIM) tablet 100 mg    atorvastatin (LIPITOR) tablet 40 mg    escitalopram (LEXAPRO) tablet 20 mg    [Held by provider] furosemide (LASIX) tablet 20 mg    metoPROLOL succinate (TOPROL-XL) ER tablet 50 mg    nystatin (MYCOSTATIN) powder 1 Application    rivaroxaban (XARELTO) tablet 20 mg    [Held by provider] sacubitril-valsartan (ENTRESTO)  24-26 MG per tablet 1 tablet    empagliflozin (JARDIANCE) tablet 10 mg    acetaminophen (TYLENOL) tablet 650 mg    ondansetron (ZOFRAN ODT) disintegrating tablet 4 mg    Or    ondansetron (ZOFRAN) injection 4 mg    prochlorperazine (COMPAZINE) tablet 5 mg    Or    prochlorperazine (COMPAZINE) injection 5 mg    docusate sodium-sennosides (SENOKOT S) 50-8.6 MG 2 tablet    bisacodyl (DULCOLAX) suppository 10 mg    magnesium hydroxide (MILK OF MAGNESIA) 400 MG/5ML suspension 30 mL    calcium carbonate (TUMS) chewable tablet 1,000 mg    diphenhydrAMINE (BENADRYL) capsule 25 mg    lactated ringers infusion    polyethylene glycol (MIRALAX) packet 17 g    VANCOMYCIN - PHARMACIST MONITORED Misc    vancomycin (VANCOCIN) 750 mg in sodium chloride 0.9 % 250 mL IVPB        ALLERGIES:  Lisinopril    OBJECTIVE:  Vital Signs:  Visit Vitals  BP 96/58 (BP Location: LUE - Left upper extremity, Patient Position: Sitting)   Pulse 62   Temp 97.5 °F (36.4 °C) (Oral)   Resp 16   Ht 5' 11\" (1.803 m)   Wt 116.4 kg (256 lb 11.2 oz)   SpO2 98%   BMI 35.80 kg/m²           Physical Exam:  General appearance: Alert, obese, and cooperative  Ulcer and Wound: See wound assessments    Left Achilles: hyperpigmentation over healed wound site, no open wound or drainage. Adherent scale. No pain with palpation    Left lateral chest: Full thickness wound with non-viable tissue to base and old sanguinous drainage. Periwound with induration. Circumferential undermining.       8/28/24 Left Chest    Left Achilles, healed      Left Achilles 6/12 6/19      Wound Bed Quality:  Granulation tissue and Non-viable tissue        Wound Measurements Per Flowsheet:       Wound Knee Left Incision (Active)   Number of days: 78       Wound Chest Left Other (comment) (Active)   Wound Length (cm) 0.7 cm 08/28/24 1455   Wound Width (cm) 0.5 cm 08/28/24 1455   Wound Depth (cm) 0.4 cm 08/28/24 1455   Wound Surface Area (cm^2) 0.35 cm^2 08/28/24 1455   Wound Volume (cm^3) 0.14  cm^3 08/28/24 1455   Number of days: 72       Wound Nose Surgical Wound (Active)   Number of days: 71       Wound Head  Superficial Puncture (Active)   Number of days: 72       Wound Knee Left Midline/Middle Incision (Active)   Number of days: 1       Wound Achilles Left (Active)   Wound Length (cm) 1 cm 08/28/24 1455   Wound Width (cm) 1 cm 08/28/24 1455   Wound Depth (cm) 0.1 cm 08/28/24 1455   Wound Surface Area (cm^2) 1 cm^2 08/28/24 1455   Wound Volume (cm^3) 0.1 cm^3 08/28/24 1455   Number of days: 0     PROCEDURE:  None performed    Procedure was Performed by:  Not applicable    Laboratory assessments reviewed:  No results found for: \"PAB\"   Albumin (g/dL)   Date Value   07/02/2024 3.5 (L)   03/06/2024 1.9 (L)   03/05/2024 2.3 (L)      No results available in last 24 hours      Lab Results   Component Value Date    WBC 10.0 08/28/2024    GLUCOSE 133 (H) 08/28/2024    HGBA1C 7.6 (H) 05/25/2017    CRP 22.9 (H) 08/05/2024    RESR 44 (H) 08/05/2024    CREATININE 1.53 (H) 08/28/2024    GFRA 85 11/22/2019    GFRNA 94 07/02/2024        Culture results:    Diagnostic Assessments Reviewed:  Vascular Studies:  Arterial duplex study    5/25/24 arterial ultrasound  1. No evidence of hemodynamically significant right lower extremity femoral  popliteal outflow disease. There is elevated velocity in the proximal right  anterior tibial artery; however there is preservation of triphasic flow  distally. The peroneal artery could not be visualized which may be  secondary to technical factors, although occlusion cannot be excluded.     2. No evidence of hemodynamically significant left lower extremity  femoropopliteal outflow disease. The ventral artery could not be visualized  which may be technical, with occlusion not excluded. There is biphasic to  triphasic flow in the anterior and posterior tibial arteries to the level  of the foot.    Nutritional Assessment:  Prealbumin and/or Albumin reviewed  Albumin (g/dL)   Date Value    07/02/2024 3.5 (L)       Wound treatment goals are palliative:  No    DIAGNOSES:  Non-healing surgical wound Left chest  S/P excision squamous cell carcinoma of left chest      Medical Decision Making:   Jimbo is a 68 year old male with left chest wound secondary to excision of SCC.   Previously followed in wound care for left achilles wound that is healed.    Full thickness left chest dehisced surgical wound. Wound base with old sanguinous drainage. Circumferential undermining, no palpable bone. Will need to heal by secondary intention.   Too small for moist packing.  Start Daily collagen dressing to base and cover with foam border dressing. May use gauze and tape at home.     Local Wound Care(Left chest):  - wash with soap and water  - apply Daily to wound base  - secondary dressing either foam or gauze and tape  - change 3 times per week and PRN      Left knee surgical wound per orthopedics.     Edema  -Encourage elevating bilateral lower legs as much as possible to assist with lower leg swelling.       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 Pt opted for long term suppressive antibiotics vs surgical intervention. NM bone scan 3/11/24 with findings suspicious for left knee prosthetic infection.   - ID managing antibiotics.   - Ortho evaluated 3/6/24 - notes reviewed - pt is at risk for limb loss.   - post left knee explant to antibiotic spacer on 6/11/24 under the care of Dr. Andino.   -Infectious disease, Dr. Villa following   -Patient on Vancomycin and rocephin.     Offloading:  - no direct pressure on wound      Diabetes & Nutrition:  Encourage glycemic control to promote wound healing.   Hemoglobin A1C (%)   Date Value   05/25/2017 7.6 (H)     Encourage protein rich diet to promote wound healing.    Albumin (g/dL)   Date Value   07/02/2024 3.5 (L)     Follow-up:  Will follow IP.       Plan of Care:  Advanced Wound Care Recommendations:  see medical decision making  Percent Wound Closure from consult:  n/a initial consult 3/6/24  Care plan to augment wound closure:  Not applicable.  .      Significant note data copied forward from MILAD Coon and reviewed by me as needed in the formulation of this note and plan.  Keily Epstein NP  [facility]      Inpatient wound care coordinator: [phone]  Visit coordination, dressing/VAC questions, follow up appointments.   Monday-Friday: business hours, Saturday & Sunday: 7am-12pm     Inpatient wound care APC: [phone]   Clinical questions/concerns, home care orders, discharge orders.   Monday-Saturday: business hours     Hyperbaric department: [phone]   Monday-Friday: business hours, Saturday & Sunday: 7am-12pm     Outpatient [facility] wound clinic: [phone]   Monday-Friday: business hours     After hours, weekends, and holidays, please contact the answering service for clinical emergencies: [phone]     Attending Only
